# Patient Record
Sex: MALE | Race: WHITE
[De-identification: names, ages, dates, MRNs, and addresses within clinical notes are randomized per-mention and may not be internally consistent; named-entity substitution may affect disease eponyms.]

---

## 2019-10-15 ENCOUNTER — HOSPITAL ENCOUNTER (INPATIENT)
Dept: HOSPITAL 74 - FER | Age: 79
Discharge: ANOTHER HOSPITAL | DRG: 208 | End: 2019-10-15
Attending: NURSE PRACTITIONER | Admitting: INTERNAL MEDICINE
Payer: COMMERCIAL

## 2019-10-15 VITALS — DIASTOLIC BLOOD PRESSURE: 61 MMHG | HEART RATE: 102 BPM | SYSTOLIC BLOOD PRESSURE: 104 MMHG

## 2019-10-15 VITALS — TEMPERATURE: 99.8 F

## 2019-10-15 VITALS — BODY MASS INDEX: 34.2 KG/M2

## 2019-10-15 DIAGNOSIS — N18.9: ICD-10-CM

## 2019-10-15 DIAGNOSIS — I48.0: ICD-10-CM

## 2019-10-15 DIAGNOSIS — Z95.1: ICD-10-CM

## 2019-10-15 DIAGNOSIS — I13.0: ICD-10-CM

## 2019-10-15 DIAGNOSIS — I25.10: ICD-10-CM

## 2019-10-15 DIAGNOSIS — E78.5: ICD-10-CM

## 2019-10-15 DIAGNOSIS — I12.9: ICD-10-CM

## 2019-10-15 DIAGNOSIS — E11.22: ICD-10-CM

## 2019-10-15 DIAGNOSIS — J96.91: Primary | ICD-10-CM

## 2019-10-15 DIAGNOSIS — Z79.84: ICD-10-CM

## 2019-10-15 DIAGNOSIS — I48.91: ICD-10-CM

## 2019-10-15 DIAGNOSIS — N40.0: ICD-10-CM

## 2019-10-15 DIAGNOSIS — Z87.891: ICD-10-CM

## 2019-10-15 DIAGNOSIS — Z86.718: ICD-10-CM

## 2019-10-15 DIAGNOSIS — E03.9: ICD-10-CM

## 2019-10-15 DIAGNOSIS — N17.9: ICD-10-CM

## 2019-10-15 DIAGNOSIS — Z95.5: ICD-10-CM

## 2019-10-15 DIAGNOSIS — I27.20: ICD-10-CM

## 2019-10-15 DIAGNOSIS — I50.33: ICD-10-CM

## 2019-10-15 LAB
ALBUMIN SERPL-MCNC: 3 G/DL (ref 3.4–5)
ALBUMIN SERPL-MCNC: 3.2 G/DL (ref 3.4–5)
ALP SERPL-CCNC: 100 U/L (ref 45–117)
ALP SERPL-CCNC: 93 U/L (ref 45–117)
ALT SERPL-CCNC: 21 U/L (ref 13–61)
ALT SERPL-CCNC: 22 U/L (ref 13–61)
ANION GAP SERPL CALC-SCNC: 11 MMOL/L (ref 8–16)
ANION GAP SERPL CALC-SCNC: 9 MMOL/L (ref 8–16)
ARTERIAL BLOOD GAS PCO2: 28.2 MMHG (ref 35–45)
ARTERIAL BLOOD GAS PCO2: 30.8 MMHG (ref 35–45)
ARTERIAL PATENCY WRIST A: POSITIVE
AST SERPL-CCNC: 23 U/L (ref 15–37)
AST SERPL-CCNC: 24 U/L (ref 15–37)
BASE EXCESS BLDA CALC-SCNC: -4.7 MEQ/L (ref -2–2)
BASE EXCESS BLDA CALC-SCNC: -4.9 MEQ/L (ref -2–2)
BASOPHILS # BLD: 0.3 % (ref 0–2)
BILIRUB SERPL-MCNC: 0.8 MG/DL (ref 0.2–1)
BILIRUB SERPL-MCNC: 0.9 MG/DL (ref 0.2–1)
BUN SERPL-MCNC: 29 MG/DL (ref 7–18)
BUN SERPL-MCNC: 31 MG/DL (ref 7–18)
CALCIUM SERPL-MCNC: 8 MG/DL (ref 8.5–10)
CALCIUM SERPL-MCNC: 8.2 MG/DL (ref 8.5–10)
CHLORIDE SERPL-SCNC: 108 MMOL/L (ref 98–107)
CHLORIDE SERPL-SCNC: 108 MMOL/L (ref 98–107)
CO2 SERPL-SCNC: 18 MMOL/L (ref 21–32)
CO2 SERPL-SCNC: 21 MMOL/L (ref 21–32)
CREAT SERPL-MCNC: 2.7 MG/DL (ref 0.55–1.3)
CREAT SERPL-MCNC: 2.7 MG/DL (ref 0.55–1.3)
DEPRECATED RDW RBC AUTO: 14.7 % (ref 11.9–15.9)
EOSINOPHIL # BLD: 3.1 % (ref 0–4.5)
GLUCOSE SERPL-MCNC: 133 MG/DL (ref 74–106)
GLUCOSE SERPL-MCNC: 175 MG/DL (ref 74–106)
HCT VFR BLD CALC: 35 % (ref 35.4–49)
HGB BLD-MCNC: 11.2 GM/DL (ref 11.7–16.9)
INR BLD: 1.27 (ref 0.82–1.09)
LYMPHOCYTES # BLD: 21.1 % (ref 8–40)
MCH RBC QN AUTO: 28.1 PG (ref 25.7–33.7)
MCHC RBC AUTO-ENTMCNC: 32 G/DL (ref 32–35.9)
MCV RBC: 87.8 FL (ref 80–96)
MONOCYTES # BLD AUTO: 8 % (ref 3.8–10.2)
NEUTROPHILS # BLD: 67.5 % (ref 42.8–82.8)
PH BLDV: 7.4 [PH] (ref 7.31–7.41)
PLATELET # BLD AUTO: 387 K/MM3 (ref 134–434)
PMV BLD: 9.4 FL (ref 7.5–11.1)
PO2 BLDA: 60 MMHG (ref 80–100)
PO2 BLDA: 78.3 MMHG (ref 80–100)
POTASSIUM SERPLBLD-SCNC: 3.6 MMOL/L (ref 3.5–5.1)
POTASSIUM SERPLBLD-SCNC: 3.9 MMOL/L (ref 3.5–5.1)
PROT SERPL-MCNC: 6.4 G/DL (ref 6.4–8.2)
PROT SERPL-MCNC: 6.6 G/DL (ref 6.4–8.2)
PT PNL PPP: 14.2 SEC (ref 10.2–13)
RBC # BLD AUTO: 3.99 M/MM3 (ref 4–5.6)
SAO2 % BLDA: 89.9 % (ref 95–98)
SAO2 % BLDA: 95.7 % (ref 95–98)
SODIUM SERPL-SCNC: 137 MMOL/L (ref 136–145)
SODIUM SERPL-SCNC: 138 MMOL/L (ref 136–145)
VENOUS PC02: 36.1 MMHG (ref 38–52)
VENOUS PO2: < 49 MMHG (ref 28–48)
WBC # BLD AUTO: 9.1 K/MM3 (ref 4–10.8)

## 2019-10-15 PROCEDURE — 5A1935Z RESPIRATORY VENTILATION, LESS THAN 24 CONSECUTIVE HOURS: ICD-10-PCS

## 2019-10-15 PROCEDURE — 0BH17EZ INSERTION OF ENDOTRACHEAL AIRWAY INTO TRACHEA, VIA NATURAL OR ARTIFICIAL OPENING: ICD-10-PCS

## 2019-10-15 RX ADMIN — METOLAZONE ONE: 2.5 TABLET ORAL at 17:21

## 2019-10-15 RX ADMIN — METOLAZONE ONE MG: 2.5 TABLET ORAL at 17:39
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
## 2019-10-15 NOTE — HOSP
Subjective





- Review of Symptoms


Events since last encounter: 





Patient became agitated, pulled off BiPAP, pushing staff away, pinching, 

fighting all attempts at medical intervention. Soft wrist restraints placed. 

Dr. Anaya at bedside, decision made to intubate.





Spoke with oldest son Duong who is HCP. Patient never discussed with him end of 

life/goals of care. Never signed a living will. Told Duong we will consider 

patient a full code and he agreed. 





8:00pm Advised Duong we are intubating patient. He acknowledged and said he is 

en route to the hospital.





9:30p [facility] critical care team here. Son Duong present, will accompany patient.











Physical Examination


Vital Signs: 


 Vital Signs











Temperature  99.8 F H  10/15/19 18:04


 


Pulse Rate  74   10/15/19 18:04


 


Respiratory Rate  22 H  10/15/19 18:04


 


Blood Pressure  125/68   10/15/19 18:04


 


O2 Sat by Pulse Oximetry (%)  99   10/15/19 17:11











Labs: 


 CBC, BMP





 10/15/19 09:00 





 10/15/19 15:41
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
## 2019-10-15 NOTE — ECHO
Version:  1



Name:  RENITA COLMENARES                               Exam: Adult Echocardiogram

MRN:  [id number]                                       Study Date:  10/15/2019, 2:19 PM

Age:  78 Years



 ____________________________________________________________________________________________________
__________



MMode/2D Measurements & Calculations





IVSd: 1.48 cm                                          LVIDs: 3.6 cm

LVIDd: 4.8 cm

LVPWd: 0.79 cm

LVOT diam: 1.98 cm                                     Ao root diam: 3.5 cm

                                                       LA dimension: 4.3 cm



Doppler Measurements & Calculations



MV E max leatha: 138.8 cm/sec

MR max P.7 mmHg

Ao max P.7 mmHg

Ao V2 max: 129.8 cm/sec

PI end-d leatha: 178.6 cm/sec                             TR max leatha: 265.6 cm/sec

                                                       TR max P.4 mmHg



 Left Ventricle

 The left ventricular size, thickness and function are normal. Ejection Fraction = 55%. The transmitr
al

 spectral Doppler flow pattern is suggestive of impaired LV relaxation.

 Right Ventricle

 The right ventricle is normal in size and function.

 Atria

 The left atrium is moderately dilated. The right atrium is mildly dilated.

 Mitral Valve

 There is mild mitral annular calcification. There is moderate mitral regurgitation.

 Tricuspid Valve

 The tricuspid valve is normal. There is mild to moderate tricuspid regurgitation.

 Aortic Valve

 There is mild aortic sclerosis.;. Mild aortic regurgitation.

 Pulmonic Valve

 The pulmonic valve is not well visualized.

 Great Vessels

 The aortic root is normal size. Normal aortic arch, descending and ascending aorta.

 Pericardium/Pleura

 There is no pericardial effusion.







 Summary Statements

 The left ventricular size, thickness and function are normal

 Ejection Fraction = 55%.

 The transmitral spectral Doppler flow pattern is suggestive of impaired LV relaxation.

 The right ventricle is normal in size and function.

 The left atrium is moderately dilated.

 The right atrium is mildly dilated.

 There is mild mitral annular calcification.

 There is moderate mitral regurgitation.

 The tricuspid valve is normal.

 There is mild to moderate tricuspid regurgitation.

 There is mild aortic sclerosis.;

 Mild aortic regurgitation.

 The pulmonic valve is not well visualized.

 The aortic root is normal size.

 Normal aortic arch, descending and ascending aorta

 There is no pericardial effusion.





                    Francisco Shearer         10/15/2019, 2:06 PM

 Ordering Physician:  Shandra Presley

 Performed By:  Nery Vargas

## 2019-10-15 NOTE — CON.CARD
Consult


Consult Specialty:: Cardiology


Referred by:: Hospitalist (patient of Dr. Ralph Liz)


Reason for Consultation:: Cardiac evaluation





- History of Present Illness


Chief Complaint: Shortness of breath and rapid HR


History of Present Illness: 





Patient is a 78 year old male (previously seen by [facility], 

Dr. Elías Viramontes) with underlying history of CAD s/p CABG 3 vessel (LIMA to LAD, SVG 

to OM and SVG to right PDA in 2010),? possible PCI/stent, HTN, type 2 DM, 

hypercholesterolemia, CKD and history of PAF (not on anticoagulation at this 

time) who presents with complaints of increased shortness of breath and 

presence of atrial fibrillation with RVR. He is on 100% O2 via NRB at this 

time. He complains of dyspnea and palpitations. He denies chest pain. He denies 

fever or chills. He denies orthopnea or PND. He denies nausea, vomiting, 

diarrhea or abdominal pain. He denies headache or lightheadedness. Prior record 

indicates history of DCCV in 2010 and he states that he had taken Coumadin at 

that time but it appears to have been stopped at some point. He states that he 

had taken Xarelto but it also was stopped 1 and 1/2 months later at the 

instruction from PMD. He states it was due to "blood clot" in his leg. He had 

not seen the cardiologist for past 2 years. 





- History Source


History Provided By: Patient, Medical Record


Limitations to Obtaining History: Clinical Condition





- Past Medical History


Cardio/Vascular: Yes: AFIB, Aortic Insufficiency, CAD, CHF, Deep Vein Thrombosis

, HTN, Hyperlipdemia, Pulmonary Hypertension


Renal/: Yes: Renal Inusuff


Endocrine: Yes: Diabetes Mellitus





- Past Surgical History


Past Surgical History: Yes: CABG, Hernia Repair





- Alcohol/Substance Use


Hx Alcohol Use: Yes (ocasional)





- Smoking History


Smoking history: Former smoker


Have you smoked in the past 12 months: No


Aproximately how many cigarettes per day: 0


If you are a former smoker, when did you quit?: 50yrs





Home Medications





- Allergies


Allergies/Adverse Reactions: 


 Allergies











Allergy/AdvReac Type Severity Reaction Status Date / Time


 


No Known Allergies Allergy   Verified 10/15/19 08:55














- Home Medications


Home Medications: 


Ambulatory Orders





Aspirin [Aspir 81] 81 mg PO BID 02/05/13 


Amlodipine Besylate 10 mg PO HS  tablet 12/16/13 


Glimepiride [Amaryl -] 1 mg PO DAILY 10/15/19 


Irbesartan [Avapro (Nf) -] 300 mg PO DAILY 10/15/19 











Family Medical History


Family Hx Cancer: Father (Colon CA)





Review of Systems





- Review of Systems


Constitutional: denies: Chills, Fever


Cardiovascular: reports: Palpitations, Shortness of Breath.  denies: Chest Pain


Respiratory: reports: Cough, SOB, SOB on Exertion.  denies: Hemoptysis, 

Orthopnea, PND


Gastrointestinal: denies: Abdominal Pain, Constipation, Diarrhea, Melena, Nausea

, Rectal Bleeding, Vomiting


Musculoskeletal: reports: Joint Pain.  denies: Back Pain


Neurological: denies: Dizziness, Headache, Seizure, Syncope


Vital Signs: 


 Vital Signs











Temperature  98.6 F   10/15/19 12:56


 


Pulse Rate  119 H  10/15/19 12:56


 


Respiratory Rate  20   10/15/19 12:56


 


Blood Pressure  158/71   10/15/19 12:56


 


O2 Sat by Pulse Oximetry (%)  91 L  10/15/19 12:15











Eyes: Yes: PERRL


HENT: Yes: Atraumatic


Neck: Yes: Supple


Respiratory: Yes: Diminished, On Venti-Mask (NRB)


Gastrointestinal: Yes: Normal Bowel Sounds, Soft.  No: Tenderness


Cardiovascular: Yes: Tachycardia, Pulse Irregular


JVD: No


PMI: Non-Displaced


Heart Sounds: Yes: S1, S2


Murmur: Yes: Systolic Murmur, Grade 1


Edema: Yes


Edema: LLE: 1+, RLE: 1+





- Other Data


Labs, Other Data: 


 CBC, BMP





 10/15/19 09:00 





 10/15/19 09:00 





 INR, PTT











INR  1.27  (0.82-1.09)  H  10/15/19  09:00    








 Troponin, BNP











  10/15/19 10/15/19





  09:00 09:00


 


Troponin I  0.03 


 


B-Natriuretic Peptide   19564.5 H








  Laboratory Results - last 24 hr











  10/15/19 10/15/19 10/15/19





  09:00 09:00 09:00


 


WBC  9.1  


 


RBC  3.99 L  


 


Hgb  11.2 L  


 


Hct  35.0 L  


 


MCV  87.8  


 


MCH  28.1  


 


MCHC  32.0  


 


RDW  14.7  


 


Plt Count  387  D  


 


MPV  9.4  D  


 


Absolute Neuts (auto)  6.2  


 


Neutrophils %  67.5  


 


Lymphocytes %  21.1  


 


Monocytes %  8.0  


 


Eosinophils %  3.1  


 


Basophils %  0.3  


 


PT with INR   


 


INR   


 


VBG pH   


 


POC VBG pCO2   


 


POC VBG pO2   


 


VBG HCO3   


 


VBG O2 Sat (Francisca)   


 


VBG Base Excess   


 


Sodium   138 


 


Potassium   3.6 


 


Chloride   108 H 


 


Carbon Dioxide   21 


 


Anion Gap   9 


 


BUN   29.0 H 


 


Creatinine   2.7 H 


 


Est GFR (CKD-EPI)AfAm   24.86 


 


Est GFR (CKD-EPI)NonAf   21.45 


 


Random Glucose   133 H 


 


Calcium   8.2 L 


 


Total Bilirubin   0.8 


 


AST   24 


 


ALT   21 


 


Alkaline Phosphatase   100 


 


Creatine Kinase   


 


Creatine Kinase Index   


 


CK-MB (CK-2)   


 


Troponin I    0.03


 


B-Natriuretic Peptide   


 


Total Protein   6.6 


 


Albumin   3.2 L 


 


Urine Color   


 


Urine Appearance   


 


Urine pH   


 


Urine Protein   


 


Urine Glucose (UA)   


 


Urine Ketones   


 


Urine Blood   


 


Urine Nitrite   


 


Urine Bilirubin   


 


Urine Urobilinogen   


 


Ur Leukocyte Esterase   


 


Urine RBC   


 


Urine WBC   


 


Digoxin   














  10/15/19 10/15/19 10/15/19





  09:00 09:00 09:00


 


WBC   


 


RBC   


 


Hgb   


 


Hct   


 


MCV   


 


MCH   


 


MCHC   


 


RDW   


 


Plt Count   


 


MPV   


 


Absolute Neuts (auto)   


 


Neutrophils %   


 


Lymphocytes %   


 


Monocytes %   


 


Eosinophils %   


 


Basophils %   


 


PT with INR    14.2 H


 


INR    1.27 H


 


VBG pH   


 


POC VBG pCO2   


 


POC VBG pO2   


 


VBG HCO3   


 


VBG O2 Sat (Francisca)   


 


VBG Base Excess   


 


Sodium   


 


Potassium   


 


Chloride   


 


Carbon Dioxide   


 


Anion Gap   


 


BUN   


 


Creatinine   


 


Est GFR (CKD-EPI)AfAm   


 


Est GFR (CKD-EPI)NonAf   


 


Random Glucose   


 


Calcium   


 


Total Bilirubin   


 


AST   


 


ALT   


 


Alkaline Phosphatase   


 


Creatine Kinase  337 H  


 


Creatine Kinase Index  2.3  


 


CK-MB (CK-2)  7.9 H  


 


Troponin I   


 


B-Natriuretic Peptide   67427.5 H 


 


Total Protein   


 


Albumin   


 


Urine Color   


 


Urine Appearance   


 


Urine pH   


 


Urine Protein   


 


Urine Glucose (UA)   


 


Urine Ketones   


 


Urine Blood   


 


Urine Nitrite   


 


Urine Bilirubin   


 


Urine Urobilinogen   


 


Ur Leukocyte Esterase   


 


Urine RBC   


 


Urine WBC   


 


Digoxin   














  10/15/19 10/15/19 10/15/19





  09:00 09:06 11:06


 


WBC   


 


RBC   


 


Hgb   


 


Hct   


 


MCV   


 


MCH   


 


MCHC   


 


RDW   


 


Plt Count   


 


MPV   


 


Absolute Neuts (auto)   


 


Neutrophils %   


 


Lymphocytes %   


 


Monocytes %   


 


Eosinophils %   


 


Basophils %   


 


PT with INR   


 


INR   


 


VBG pH   7.40 


 


POC VBG pCO2   36.1 L 


 


POC VBG pO2   < 49 H 


 


VBG HCO3   22.1 L 


 


VBG O2 Sat (Francisca)   30.7 L 


 


VBG Base Excess   -1.7 


 


Sodium   


 


Potassium   


 


Chloride   


 


Carbon Dioxide   


 


Anion Gap   


 


BUN   


 


Creatinine   


 


Est GFR (CKD-EPI)AfAm   


 


Est GFR (CKD-EPI)NonAf   


 


Random Glucose   


 


Calcium   


 


Total Bilirubin   


 


AST   


 


ALT   


 


Alkaline Phosphatase   


 


Creatine Kinase   


 


Creatine Kinase Index   


 


CK-MB (CK-2)   


 


Troponin I   


 


B-Natriuretic Peptide   


 


Total Protein   


 


Albumin   


 


Urine Color    Roseanna


 


Urine Appearance    Slightly


 


Urine pH    5.5


 


Urine Protein    3+ H


 


Urine Glucose (UA)    Trace


 


Urine Ketones    Negative


 


Urine Blood    3+ H


 


Urine Nitrite    Negative


 


Urine Bilirubin    1+ H


 


Urine Urobilinogen    1.0


 


Ur Leukocyte Esterase    Negative


 


Urine RBC    >100


 


Urine WBC    2-5


 


Digoxin  < 0.3 L  

















Atrial fibrillation with nonspecific ST-T abnormality


Echo: Pending





Imaging





- Results


Chest X-ray: Report Reviewed (Congestive changes)


Cat Scan: Report Reviewed (Chest CT congestions)


Ultrasound: Report Reviewed (No DVT)


EKG: Report Reviewed





Problem List





- Problems


(1) Atrial fibrillation


Code(s): I48.91 - UNSPECIFIED ATRIAL FIBRILLATION   


Qualifiers: 


   Qualified Code(s): I48.91 - Unspecified atrial fibrillation   





(2) Congestive heart failure


Code(s): I50.9 - HEART FAILURE, UNSPECIFIED   


Qualifiers: 


   Qualified Code(s): I50.9 - Heart failure, unspecified   





(3) BEEBE (dyspnea on exertion)


Code(s): R06.09 - OTHER FORMS OF DYSPNEA   





(4) Hypoxia


Code(s): R09.02 - HYPOXEMIA   





Assessment/Plan








1. Acute on chronic class 1-2 NYHA classification heart failure with AF in RVR


2. PAF recurrence with RVR [id number] score of 6


3. HTN/HCVD


4. CAD s/p PCI/stent, s/p CABG x 3 vessels, angina pectoris


5. Hypercholesterolemia


6. Type 2 DM


7. Acute on CKD (elevated Cr)


8. Hypothyroidism





PLAN:


1. Increase Amidarone 200 mg BID


2. Add Metoprolol 50 mg BID


3. Continue Amlodipine 10 mg QD and Clonidine 0.2 mg QD as tolerated


4. ACEI or ARB (previously on Valsartan) once renal function normalizes


5. Diuretics (IV Lasix 40 mg BID) and monitor I/Os, daily weight, renal 

function and electrolytes


6. Nitrates as tolerated


7. Continue Atorvastatin 40 mg QHS


8. Thyroid replacement therapy


9. Echocardiography to reassess LV/RV and valvular function


10. Continue rate control and if remains in AF, may consider synchronized 

cardioversion after adequate anticoagulation


11. In view of increased risk score for stroke, ideally needs to be on 

anticoagulation such as Eliquis (dose to be determined, possibly 2.5 mg BID). 

In the interim, may use Heparin protocol if patient does not have hematuria.


12. Further cardiac evaluation can be done as outpatient once clinically 

improved. 





Devonte Weinstein MD

## 2019-10-16 NOTE — EKG
Test Reason : 

Blood Pressure : ***/*** mmHG

Vent. Rate : 118 BPM     Atrial Rate : 117 BPM

   P-R Int : 000 ms          QRS Dur : 106 ms

    QT Int : 324 ms       P-R-T Axes : 000 024 019 degrees

   QTc Int : 454 ms

 

*** POOR DATA QUALITY, INTERPRETATION MAY BE ADVERSELY AFFECTED

ATRIAL FIBRILLATION WITH RAPID VENTRICULAR RESPONSE

NONSPECIFIC ST AND T WAVE ABNORMALITY

ABNORMAL ECG

NO PREVIOUS ECGS AVAILABLE

Confirmed by TABITHA GRAHAM, TARA ([id number]) on 10/16/2019 10:39:28 AM

 

Referred By: LUZ MAGAÑA           Confirmed By:TARA LU MD

## 2019-10-16 NOTE — EKG
Test Reason : 

Blood Pressure : ***/*** mmHG

Vent. Rate : 103 BPM     Atrial Rate : 202 BPM

   P-R Int : 000 ms          QRS Dur : 104 ms

    QT Int : 372 ms       P-R-T Axes : 000 018 -18 degrees

   QTc Int : 487 ms

 

*** POOR DATA QUALITY, INTERPRETATION MAY BE ADVERSELY AFFECTED

ATRIAL FIBRILLATION WITH RAPID VENTRICULAR RESPONSE WITH PREMATURE

VENTRICULAR OR ABERRANTLY CONDUCTED COMPLEXES

NONSPECIFIC ST ABNORMALITY

ABNORMAL ECG

WHEN COMPARED WITH ECG OF 15-OCT-2019 09:04,

NONSPECIFIC T WAVE ABNORMALITY, IMPROVED IN LATERAL LEADS

Confirmed by TABITHA GRAHAM, TARA ([id number]) on 10/16/2019 10:38:31 AM

 

Referred By: ELHAM MATHEW           Confirmed By:TARA LU MD

## 2019-10-16 NOTE — ED.PROV
Physicial Exam





I saw and examined the patient.








- Vital Signs


 Last Vital Signs











Temp Pulse Resp BP Pulse Ox


 


 99.8 F H  102 H  20   104/61   99 


 


 10/15/19 18:04  10/15/19 21:15  10/15/19 21:15  10/15/19 21:15  10/15/19 17:11














Procedures





- Intubation


Time of Intubation: 20:00


Intubation Method: orotracheal


Blade used: Yang


Tube Size (Fr): 7.5


Medications: Etomidate, Versed


Tube position @ lip (cm): 24


Breath Sounds after Intubation: equal


Intubation Complications: no complications


Post Intubation Xray: Yes





Critical Care Time/MDM Note


Total Critical Care Time: 20





- Medical Decision Making


Note: 





10/16/19 06:55


At approximately  7:45 PM yesterday called to see patient with progressive 

shortness of breath with disorientation and agitation.  Exam consistent with 

progressive respiratory distress pulse oximetry at 85% on 100% nonrebreather 

mask.  The patient is awaiting transfer to [facility] for 

specialty care.  Since he has ongoing deterioration in his respiratory status,  

with progressive dyspnea and agitation, elective intubation was performed using 

RSS as noted in the procedure note.





Post intubation, tube position was confirmed by auscultation and CO2 monitor 

and he was connected to ventilator with assist control, 14 breaths/min at 100% 

O2.  Continued sedation achieved using propofol infusion.


Oxygenation improved promptly to 96-98%








Post intubation portable chest x-ray revealed continued bilateral pulmonary 

edema; ET tube in place in proper position patient.